# Patient Record
Sex: MALE | Race: BLACK OR AFRICAN AMERICAN | Employment: FULL TIME | URBAN - METROPOLITAN AREA
[De-identification: names, ages, dates, MRNs, and addresses within clinical notes are randomized per-mention and may not be internally consistent; named-entity substitution may affect disease eponyms.]

---

## 2024-07-17 ENCOUNTER — APPOINTMENT (OUTPATIENT)
Dept: RADIOLOGY | Facility: HOSPITAL | Age: 39
End: 2024-07-17

## 2024-07-17 ENCOUNTER — HOSPITAL ENCOUNTER (EMERGENCY)
Facility: HOSPITAL | Age: 39
Discharge: HOME | End: 2024-07-18

## 2024-07-17 DIAGNOSIS — S63.259A DISLOCATION OF FINGER, INITIAL ENCOUNTER: Primary | ICD-10-CM

## 2024-07-17 PROCEDURE — 26770 TREAT FINGER DISLOCATION: CPT | Mod: F9

## 2024-07-17 PROCEDURE — 73130 X-RAY EXAM OF HAND: CPT | Mod: RT

## 2024-07-17 PROCEDURE — 99283 EMERGENCY DEPT VISIT LOW MDM: CPT | Mod: 25

## 2024-07-17 PROCEDURE — 73130 X-RAY EXAM OF HAND: CPT | Mod: RIGHT SIDE | Performed by: RADIOLOGY

## 2024-07-17 RX ORDER — LIDOCAINE HYDROCHLORIDE 10 MG/ML
5 INJECTION INFILTRATION; PERINEURAL ONCE
Status: DISCONTINUED | OUTPATIENT
Start: 2024-07-17 | End: 2024-07-18 | Stop reason: HOSPADM

## 2024-07-17 RX ORDER — OXYCODONE HYDROCHLORIDE 5 MG/1
5 TABLET ORAL ONCE
Status: COMPLETED | OUTPATIENT
Start: 2024-07-18 | End: 2024-07-18

## 2024-07-17 ASSESSMENT — COLUMBIA-SUICIDE SEVERITY RATING SCALE - C-SSRS
2. HAVE YOU ACTUALLY HAD ANY THOUGHTS OF KILLING YOURSELF?: NO
6. HAVE YOU EVER DONE ANYTHING, STARTED TO DO ANYTHING, OR PREPARED TO DO ANYTHING TO END YOUR LIFE?: NO
1. IN THE PAST MONTH, HAVE YOU WISHED YOU WERE DEAD OR WISHED YOU COULD GO TO SLEEP AND NOT WAKE UP?: NO

## 2024-07-17 ASSESSMENT — PAIN SCALES - GENERAL: PAINLEVEL_OUTOF10: 7

## 2024-07-17 ASSESSMENT — PAIN DESCRIPTION - DESCRIPTORS: DESCRIPTORS: CRUSHING

## 2024-07-17 ASSESSMENT — PAIN DESCRIPTION - ORIENTATION: ORIENTATION: LEFT

## 2024-07-17 ASSESSMENT — PAIN - FUNCTIONAL ASSESSMENT: PAIN_FUNCTIONAL_ASSESSMENT: 0-10

## 2024-07-17 ASSESSMENT — PAIN DESCRIPTION - LOCATION: LOCATION: HAND

## 2024-07-17 ASSESSMENT — PAIN DESCRIPTION - PAIN TYPE: TYPE: ACUTE PAIN

## 2024-07-17 ASSESSMENT — PAIN DESCRIPTION - FREQUENCY: FREQUENCY: CONSTANT/CONTINUOUS

## 2024-07-18 VITALS
HEIGHT: 69 IN | TEMPERATURE: 98.4 F | WEIGHT: 180 LBS | SYSTOLIC BLOOD PRESSURE: 138 MMHG | HEART RATE: 66 BPM | RESPIRATION RATE: 16 BRPM | DIASTOLIC BLOOD PRESSURE: 82 MMHG | BODY MASS INDEX: 26.66 KG/M2 | OXYGEN SATURATION: 97 %

## 2024-07-18 PROCEDURE — 2500000001 HC RX 250 WO HCPCS SELF ADMINISTERED DRUGS (ALT 637 FOR MEDICARE OP)

## 2024-07-18 RX ADMIN — OXYCODONE 5 MG: 5 TABLET ORAL at 00:07

## 2024-07-18 NOTE — ED PROVIDER NOTES
HPI   Chief Complaint   Patient presents with    Hand Injury     Right hand injury- fifth digit.  Mechanical fall       Patient is a 39-year-old male presents emergency department for evaluation of mechanical trip and fall with right hand injury.  Patient states that he was walking when he tripped and fell onto his right hand.  He states that he jammed his right pinky finger and has been having pain and deformity ever since.  He did not hit his head or lose any consciousness.  He denies any pain in the wrist, elbow, shoulder and denies any other injuries at this time.  He feels relatively well otherwise.  He is not currently on any blood thinners and denies any chronic medical problems.      History provided by:  Patient   used: No            Patient History   No past medical history on file.  No past surgical history on file.  No family history on file.  Social History     Tobacco Use    Smoking status: Not on file    Smokeless tobacco: Not on file   Substance Use Topics    Alcohol use: Not on file    Drug use: Not on file       Physical Exam   ED Triage Vitals [07/17/24 2212]   Temperature Heart Rate Respirations BP   36.9 °C (98.4 °F) 66 16 (!) 170/101      Pulse Ox Temp Source Heart Rate Source Patient Position   97 % Tympanic Monitor --      BP Location FiO2 (%)     -- --       Physical Exam  Constitutional:       Appearance: Normal appearance.   Cardiovascular:      Rate and Rhythm: Normal rate and regular rhythm.   Pulmonary:      Effort: Pulmonary effort is normal.      Breath sounds: Normal breath sounds.   Musculoskeletal:         General: Deformity and signs of injury present.      Comments: Right hand with notable deformity to fifth distal phalanx with possible dislocation at DIP joint with decreased range of motion of right pinky with good capillary refill.  Rest of digits of right hand with good range of motion without injury or tenderness.  No tenderness palpation over right wrist  or right hand.  Good radial pulses to right upper extremity.   Skin:     General: Skin is warm and dry.   Neurological:      General: No focal deficit present.      Mental Status: He is alert and oriented to person, place, and time.           ED Course & MDM   Diagnoses as of 07/18/24 0015   Dislocation of finger, initial encounter                       [place] Coma Scale Score: 15                        Medical Decision Making  Patient is a 39-year-old male presents emergency department for evaluation of right pinky injury after mechanical trip and fall.    Lab work not warranted at today's visit.    Scans done today were interpreted/confirmed by radiologist and also interpreted by me which included x-ray right hand.  X-ray shows acute dislocation of the distal phalanx of the fifth digit.  Repeat x-ray following reduction shows satisfactory reduction of previously noted dislocation at the distal phalanx of the right fifth digit.  No other acute fractures noted.    Medications given at today's visit include PO oxycodone    I saw this patient independently.  Patient found to have an acute fracture of distal phalanx of right fifth digit.  I performed a reduction of the fifth phalanx and patient neurovascular intact following reduction.  Patient tolerated procedure without difficulty.  Postreduction x-ray shows satisfactory reduction with no new acute fractures noted in the right hand.  Patient otherwise stable to be discharged to follow-up closely outpatient with orthopedics.  He was placed in finger splint by nursing staff.  Patient neurovascular intact following splint application.  Emergent pathologies were considered for this patient, although I have low suspicion for anything acutely emergent given patient's clinical presentation, history, physical exam, stable vital signs.  Discharging patient home is reasonable plan of care for outpatient management.    All labs, imaging, and diagnostic studies were reviewed by  me and patient was counseled on clinical impression, expectations, and plan.  Patient was educated to follow-up with PCP in the following 1-2 days.  All questions from patient were answered. They elicited understanding and were agreeable to course of treatment.  Patient was discharged in stable condition and given strict return precautions.    ** Disclaimer:  Parts of this document were written utilizing a voice to text dictation software.  Note may contain minor transcription or typographical errors that were inadvertently transcribed by the computer software.        Procedure  Orthopaedic Injury Treatment - Upper Extremity    Performed by: Joyce Urbina PA-C  Authorized by: Joyce Urbina PA-C    Consent:     Consent obtained:  Verbal    Consent given by:  Patient    Risks, benefits, and alternatives were discussed: yes      Risks discussed:  Fracture, irreducible dislocation, recurrent dislocation, nerve damage, restricted joint movement and stiffness    Alternatives discussed:  No treatment  Location:     Location:  Finger    Finger location:  R little finger    Finger dislocation type: DIP    Pre-procedure details:     Pre-procedure imaging:  X-ray    Imaging findings: dislocation present      Distal perfusion: normal    Sedation:     Sedation type:  None  Anesthesia:     Anesthesia method:  None  Procedure details:     Manipulation performed: yes      Finger reduction method:  Direct traction    Reduction successful: yes      Reduction confirmed with imaging: yes      Immobilization:  Splint and tape    Supplies used:  Aluminum splint  Post-procedure details:     Neurological function: normal      Distal perfusion: normal      Range of motion: improved      Procedure completion:  Tolerated well, no immediate complications       Joyce Urbina PA-C  07/18/24 0013       Joyce Urbina PA-C  07/18/24 0015

## 2024-07-18 NOTE — DISCHARGE INSTRUCTIONS
Follow-up closely with primary care provider in the following week.  Continue wearing finger splint and follow-up closely with orthopedic provider outpatient.  Continue ice, elevation, compression as well as Tylenol and ibuprofen help with pain.